# Patient Record
(demographics unavailable — no encounter records)

---

## 2025-03-11 NOTE — ASSESSMENT
[FreeTextEntry1] : 47 F/with prior history of psoriasis and psoriatic arthritis, previously on GALEANA, Humira, Cosentyx, currently well on Otezla.  Patient is reluctant to take immunosuppressive medication due to risk (infection and cancer).  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  Pt also c/o LBP radiating tro her left hip and left heel pain, most c/w plantar fasciitis.  Plan: - Cont Otezla 30mg BID - discussed importance of taking it BID as ordered. - Check labs. - Order x-rays. - Recommended plantar fascia stretching exercises - Rx ibuprofen 600mg TID prn - roll frozen water bottle under heels  -RTC in 3 months or sooner as needed

## 2025-03-11 NOTE — HISTORY OF PRESENT ILLNESS
[FreeTextEntry1] : 3/11/2025:  Pt c/o pain in her left heel.  Also w/ LBP radiating to her left hip, worst w/ sitting.  No pain/swelling in the rest of her joints.  Also w/ several small psoriatic lesions on her UE's and lower back.She reports that she usually akes Otezla only once per day.   No F/C, no unintentional weight loss, no night sweats, no oral ulcers, no other rashes, no alopecia, no photosensitivity, no dry eyes/dry mouth, no Raynaud symptoms, no focal weakness, no dysphagia

## 2025-03-11 NOTE — PHYSICAL EXAM
[General Appearance - Alert] : alert [General Appearance - In No Acute Distress] : in no acute distress [General Appearance - Well Nourished] : well nourished [Sclera] : the sclera and conjunctiva were normal [Respiration, Rhythm And Depth] : normal respiratory rhythm and effort [Apical Impulse] : the apical impulse was normal [Heart Rate And Rhythm] : heart rate was normal and rhythm regular [Heart Sounds] : normal S1 and S2 [Murmurs] : no murmurs [Cervical Lymph Nodes Enlarged Posterior Bilaterally] : posterior cervical [Cervical Lymph Nodes Enlarged Anterior Bilaterally] : anterior cervical [Supraclavicular Lymph Nodes Enlarged Bilaterally] : supraclavicular [Oriented To Time, Place, And Person] : oriented to person, place, and time [Musculoskeletal - Swelling] : no joint swelling seen [Motor Tone] : muscle strength and tone were normal [Outer Ear] : the ears and nose were normal in appearance [Oropharynx] : the oropharynx was normal [Neck Appearance] : the appearance of the neck was normal [Neck Cervical Mass (___cm)] : no neck mass was observed [Jugular Venous Distention Increased] : there was no jugular-venous distention [Thyroid Diffuse Enlargement] : the thyroid was not enlarged [Thyroid Nodule] : there were no palpable thyroid nodules [Edema] : there was no peripheral edema [Bowel Sounds] : normal bowel sounds [Abdomen Soft] : soft [Abdomen Tenderness] : non-tender [] : no hepato-splenomegaly [Abdomen Mass (___ Cm)] : no abdominal mass palpated [FreeTextEntry1] : noticed joint deformity in R CMC joint, normal ROM in all joints

## 2025-03-11 NOTE — REVIEW OF SYSTEMS
[Feeling Tired] : feeling tired [Recent Weight Gain (___ Lbs)] : recent [unfilled] ~Ulb weight gain [Arthralgias] : arthralgias [Skin Lesions] : skin lesion [Itching] : itching [Negative] : Heme/Lymph [Fever] : no fever [Chills] : no chills [Feeling Poorly] : not feeling poorly [Eye Pain] : no eye pain [Red Eyes] : eyes not red [Eyesight Problems] : no eyesight problems [Discharge From Eyes] : no purulent discharge from the eyes [Heart Rate Is Slow] : the heart rate was not slow [Chest Pain] : no chest pain [Palpitations] : no palpitations [Leg Claudication] : no intermittent leg claudication [Lower Ext Edema] : no lower extremity edema [Shortness Of Breath] : no shortness of breath [Wheezing] : no wheezing [Cough] : no cough [SOB on Exertion] : no shortness of breath during exertion [Dysuria] : no dysuria [Incontinence] : no incontinence [Pelvic Pain] : no pelvic pain [Joint Pain] : no joint pain [Joint Stiffness] : no joint stiffness [Limb Pain] : no limb pain [Limb Swelling] : no limb swelling [FreeTextEntry9] : L index and mild tenderness bl knees  [de-identified] : swelling of the fingers, skin rash elbows, retroauricular area, and scalp

## 2025-03-11 NOTE — REVIEW OF SYSTEMS
[Feeling Tired] : feeling tired [Recent Weight Gain (___ Lbs)] : recent [unfilled] ~Ulb weight gain [Arthralgias] : arthralgias [Skin Lesions] : skin lesion [Itching] : itching [Negative] : Heme/Lymph [Fever] : no fever [Chills] : no chills [Feeling Poorly] : not feeling poorly [Eye Pain] : no eye pain [Red Eyes] : eyes not red [Eyesight Problems] : no eyesight problems [Discharge From Eyes] : no purulent discharge from the eyes [Heart Rate Is Slow] : the heart rate was not slow [Chest Pain] : no chest pain [Palpitations] : no palpitations [Leg Claudication] : no intermittent leg claudication [Lower Ext Edema] : no lower extremity edema [Shortness Of Breath] : no shortness of breath [Wheezing] : no wheezing [Cough] : no cough [SOB on Exertion] : no shortness of breath during exertion [Dysuria] : no dysuria [Incontinence] : no incontinence [Pelvic Pain] : no pelvic pain [Joint Pain] : no joint pain [Joint Stiffness] : no joint stiffness [Limb Pain] : no limb pain [Limb Swelling] : no limb swelling [FreeTextEntry9] : L index and mild tenderness bl knees  [de-identified] : swelling of the fingers, skin rash elbows, retroauricular area, and scalp

## 2025-06-10 NOTE — ASSESSMENT
[FreeTextEntry1] : 48 F with psoriasis and psoriatic arthritis, previously on GALEANA, Humira, Cosentyx, currently doing well on Otezla.  Patient is reluctant to take immunosuppressive medication due to risk (infection and cancer).  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  LBP radiating to her left hip much improved.  Left heel pain, most c/w plantar fasciitis has resolved.  Plan: - Cont Otezla 30mg BID.  - Refill Vtama 1% cream (previously was prescribed by derm and pt responded well). - Check labs. - Awaiting x-rays. - Cont plantar fascia stretching exercises - Cont ibuprofen 600mg TID prn - roll frozen water bottle under heels  -RTC in 3 months or sooner as needed

## 2025-06-10 NOTE — PHYSICAL EXAM
[General Appearance - Alert] : alert [General Appearance - In No Acute Distress] : in no acute distress [General Appearance - Well Nourished] : well nourished [Sclera] : the sclera and conjunctiva were normal [Outer Ear] : the ears and nose were normal in appearance [Oropharynx] : the oropharynx was normal [Neck Cervical Mass (___cm)] : no neck mass was observed [Neck Appearance] : the appearance of the neck was normal [Jugular Venous Distention Increased] : there was no jugular-venous distention [Thyroid Diffuse Enlargement] : the thyroid was not enlarged [Thyroid Nodule] : there were no palpable thyroid nodules [Respiration, Rhythm And Depth] : normal respiratory rhythm and effort [Apical Impulse] : the apical impulse was normal [Heart Rate And Rhythm] : heart rate was normal and rhythm regular [Heart Sounds] : normal S1 and S2 [Murmurs] : no murmurs [Edema] : there was no peripheral edema [Abdomen Soft] : soft [Bowel Sounds] : normal bowel sounds [Abdomen Tenderness] : non-tender [] : no hepato-splenomegaly [Abdomen Mass (___ Cm)] : no abdominal mass palpated [Cervical Lymph Nodes Enlarged Posterior Bilaterally] : posterior cervical [Cervical Lymph Nodes Enlarged Anterior Bilaterally] : anterior cervical [Supraclavicular Lymph Nodes Enlarged Bilaterally] : supraclavicular [No Focal Deficits] : no focal deficits [Oriented To Time, Place, And Person] : oriented to person, place, and time [Musculoskeletal - Swelling] : no joint swelling seen [Motor Tone] : muscle strength and tone were normal [FreeTextEntry1] : psoriatic lesions on proximal forearms (R>L), scalp and retroauricular areas

## 2025-06-10 NOTE — REVIEW OF SYSTEMS
[Feeling Tired] : feeling tired [Recent Weight Gain (___ Lbs)] : recent [unfilled] ~Ulb weight gain [Arthralgias] : arthralgias [Skin Lesions] : skin lesion [Itching] : itching [Negative] : Heme/Lymph [Fever] : no fever [Chills] : no chills [Feeling Poorly] : not feeling poorly [Eye Pain] : no eye pain [Red Eyes] : eyes not red [Discharge From Eyes] : no purulent discharge from the eyes [Eyesight Problems] : no eyesight problems [Heart Rate Is Slow] : the heart rate was not slow [Chest Pain] : no chest pain [Palpitations] : no palpitations [Leg Claudication] : no intermittent leg claudication [Lower Ext Edema] : no lower extremity edema [Shortness Of Breath] : no shortness of breath [Cough] : no cough [Wheezing] : no wheezing [SOB on Exertion] : no shortness of breath during exertion [Incontinence] : no incontinence [Dysuria] : no dysuria [Pelvic Pain] : no pelvic pain [Joint Pain] : no joint pain [Joint Stiffness] : no joint stiffness [Limb Pain] : no limb pain [Limb Swelling] : no limb swelling [FreeTextEntry9] : L index and mild tenderness bl knees  [de-identified] : swelling of the fingers, skin rash elbows, retroauricular area, and scalp

## 2025-06-10 NOTE — HISTORY OF PRESENT ILLNESS
[FreeTextEntry1] : 6/10/2025:  Feeling better overall.  PsA remains well controlled overall.  No significant joint pains.  She c/o "sausage digit" in her left 2nd toe though painless.  LBP much improved.  Left plantar fasciitis has resolved. 3/11/2025:  Pt c/o pain in her left heel.  Also w/ LBP radiating to her left hip, worst w/ sitting.  No pain/swelling in the rest of her joints.  Also w/ several small psoriatic lesions on her UE's and lower back.She reports that she usually akes Otezla only once per day.   No F/C, no unintentional weight loss, no night sweats, no oral ulcers, no other rashes, no alopecia, no photosensitivity, no dry eyes/dry mouth, no Raynaud symptoms, no focal weakness, no dysphagia